# Patient Record
Sex: MALE | Race: WHITE
[De-identification: names, ages, dates, MRNs, and addresses within clinical notes are randomized per-mention and may not be internally consistent; named-entity substitution may affect disease eponyms.]

---

## 2021-12-06 ENCOUNTER — APPOINTMENT (OUTPATIENT)
Dept: INTERNAL MEDICINE | Facility: CLINIC | Age: 56
End: 2021-12-06
Payer: COMMERCIAL

## 2021-12-06 ENCOUNTER — NON-APPOINTMENT (OUTPATIENT)
Age: 56
End: 2021-12-06

## 2021-12-06 VITALS
HEIGHT: 64 IN | BODY MASS INDEX: 34.15 KG/M2 | HEART RATE: 112 BPM | SYSTOLIC BLOOD PRESSURE: 132 MMHG | OXYGEN SATURATION: 96 % | TEMPERATURE: 98.4 F | DIASTOLIC BLOOD PRESSURE: 90 MMHG | WEIGHT: 200 LBS

## 2021-12-06 VITALS — SYSTOLIC BLOOD PRESSURE: 116 MMHG | HEART RATE: 112 BPM | DIASTOLIC BLOOD PRESSURE: 72 MMHG

## 2021-12-06 DIAGNOSIS — H54.8 LEGAL BLINDNESS, AS DEFINED IN USA: ICD-10-CM

## 2021-12-06 DIAGNOSIS — I10 ESSENTIAL (PRIMARY) HYPERTENSION: ICD-10-CM

## 2021-12-06 DIAGNOSIS — Z12.11 ENCOUNTER FOR SCREENING FOR MALIGNANT NEOPLASM OF COLON: ICD-10-CM

## 2021-12-06 DIAGNOSIS — Z82.49 FAMILY HISTORY OF ISCHEMIC HEART DISEASE AND OTHER DISEASES OF THE CIRCULATORY SYSTEM: ICD-10-CM

## 2021-12-06 DIAGNOSIS — E78.5 HYPERLIPIDEMIA, UNSPECIFIED: ICD-10-CM

## 2021-12-06 DIAGNOSIS — Z00.00 ENCOUNTER FOR GENERAL ADULT MEDICAL EXAMINATION W/OUT ABNORMAL FINDINGS: ICD-10-CM

## 2021-12-06 DIAGNOSIS — Z78.9 OTHER SPECIFIED HEALTH STATUS: ICD-10-CM

## 2021-12-06 DIAGNOSIS — Z23 ENCOUNTER FOR IMMUNIZATION: ICD-10-CM

## 2021-12-06 DIAGNOSIS — H91.90 UNSPECIFIED HEARING LOSS, UNSPECIFIED EAR: ICD-10-CM

## 2021-12-06 DIAGNOSIS — E11.9 TYPE 2 DIABETES MELLITUS W/OUT COMPLICATIONS: ICD-10-CM

## 2021-12-06 DIAGNOSIS — Z82.5 FAMILY HISTORY OF ASTHMA AND OTHER CHRONIC LOWER RESPIRATORY DISEASES: ICD-10-CM

## 2021-12-06 DIAGNOSIS — F17.200 NICOTINE DEPENDENCE, UNSPECIFIED, UNCOMPLICATED: ICD-10-CM

## 2021-12-06 PROCEDURE — 0013A: CPT

## 2021-12-06 PROCEDURE — 93000 ELECTROCARDIOGRAM COMPLETE: CPT

## 2021-12-06 PROCEDURE — 36415 COLL VENOUS BLD VENIPUNCTURE: CPT

## 2021-12-06 PROCEDURE — 99386 PREV VISIT NEW AGE 40-64: CPT | Mod: 25

## 2021-12-06 RX ORDER — RAMIPRIL 5 MG/1
5 CAPSULE ORAL
Qty: 90 | Refills: 3 | Status: ACTIVE | OUTPATIENT
Start: 2021-11-28

## 2021-12-06 RX ORDER — VITS A,C,E/LUTEIN/MINERALS 300MCG-200
TABLET ORAL DAILY
Refills: 0 | Status: ACTIVE | COMMUNITY

## 2021-12-06 NOTE — HISTORY OF PRESENT ILLNESS
[FreeTextEntry1] : New patient physical [de-identified] : 56 year old M comes to establish medical care and for an annual exam. Previously followed by Dr. Ashwin Marquez at [facility] who stopped taking his insurance.\par \par He has hx of HTN and DM. He takes Metformin and Enalapril. \par He is legally blind. Followed with ophthalmologist Dr. Mojgan Mar at [facility]. \par He would like Moderna booster shot today.

## 2021-12-06 NOTE — PLAN
[FreeTextEntry1] : Check routine fasting labs.\par Discussed diet, exercise, and weight maintenance. Advised on weight loss.\par Moderna booster vaccine given today. Received flu vaccine.\par Active smoker. Consider CT lung cancer screening test.\par Prostate cancer screening with PSA.\par Due for colonoscopy, GI referral placed.\par HTN controlled on Ramipril.\par Check CMP, A1c, and lipid profile. Continue Metformin for DM. Not on statin. \par \par RTO in 6 months for BP check and fasting labs.

## 2021-12-06 NOTE — PHYSICAL EXAM
[No Acute Distress] : no acute distress [Well Nourished] : well nourished [Well Developed] : well developed [Well-Appearing] : well-appearing [Normal Sclera/Conjunctiva] : normal sclera/conjunctiva [PERRL] : pupils equal round and reactive to light [EOMI] : extraocular movements intact [Normal Outer Ear/Nose] : the outer ears and nose were normal in appearance [Normal Oropharynx] : the oropharynx was normal [Normal TMs] : both tympanic membranes were normal [No JVD] : no jugular venous distention [No Lymphadenopathy] : no lymphadenopathy [Supple] : supple [Thyroid Normal, No Nodules] : the thyroid was normal and there were no nodules present [No Respiratory Distress] : no respiratory distress  [No Accessory Muscle Use] : no accessory muscle use [Clear to Auscultation] : lungs were clear to auscultation bilaterally [Normal Rate] : normal rate  [Regular Rhythm] : with a regular rhythm [Normal S1, S2] : normal S1 and S2 [No Murmur] : no murmur heard [No Carotid Bruits] : no carotid bruits [No Varicosities] : no varicosities [Pedal Pulses Present] : the pedal pulses are present [No Edema] : there was no peripheral edema [Soft] : abdomen soft [Non Tender] : non-tender [Non-distended] : non-distended [No Masses] : no abdominal mass palpated [No HSM] : no HSM [Normal Bowel Sounds] : normal bowel sounds [Normal Posterior Cervical Nodes] : no posterior cervical lymphadenopathy [Normal Anterior Cervical Nodes] : no anterior cervical lymphadenopathy [No CVA Tenderness] : no CVA  tenderness [No Spinal Tenderness] : no spinal tenderness [No Joint Swelling] : no joint swelling [Grossly Normal Strength/Tone] : grossly normal strength/tone [No Rash] : no rash [Coordination Grossly Intact] : coordination grossly intact [No Focal Deficits] : no focal deficits [Normal Gait] : normal gait [Deep Tendon Reflexes (DTR)] : deep tendon reflexes were 2+ and symmetric [Normal Affect] : the affect was normal [Normal Mood] : the mood was normal [Normal Insight/Judgement] : insight and judgment were intact [de-identified] : friendly, overweight [FreeTextEntry1] : defer to GI  [de-identified] : limited ROM right shoulder

## 2021-12-06 NOTE — REVIEW OF SYSTEMS
[Negative] : Heme/Lymph [Vision Problems] : vision problems [Joint Pain] : joint pain [FreeTextEntry9] : right shoulder pain x 3 months

## 2022-01-17 LAB
25(OH)D3 SERPL-MCNC: 25.9 NG/ML
ALBUMIN SERPL ELPH-MCNC: 4.9 G/DL
ALP BLD-CCNC: 124 U/L
ALT SERPL-CCNC: 40 U/L
ANION GAP SERPL CALC-SCNC: 15 MMOL/L
APPEARANCE: CLEAR
AST SERPL-CCNC: 23 U/L
BACTERIA: NEGATIVE
BASOPHILS # BLD AUTO: 0.05 K/UL
BASOPHILS NFR BLD AUTO: 0.4 %
BILIRUB SERPL-MCNC: 0.5 MG/DL
BILIRUBIN URINE: NEGATIVE
BLOOD URINE: NEGATIVE
BUN SERPL-MCNC: 9 MG/DL
CALCIUM SERPL-MCNC: 9.5 MG/DL
CHLORIDE SERPL-SCNC: 99 MMOL/L
CHOLEST SERPL-MCNC: 169 MG/DL
CO2 SERPL-SCNC: 23 MMOL/L
COLOR: YELLOW
CREAT SERPL-MCNC: 0.74 MG/DL
EOSINOPHIL # BLD AUTO: 0.1 K/UL
EOSINOPHIL NFR BLD AUTO: 0.9 %
ESTIMATED AVERAGE GLUCOSE: 209 MG/DL
GLUCOSE QUALITATIVE U: NEGATIVE
GLUCOSE SERPL-MCNC: 136 MG/DL
HBA1C MFR BLD HPLC: 8.9 %
HCT VFR BLD CALC: 48.1 %
HDLC SERPL-MCNC: 61 MG/DL
HGB BLD-MCNC: 16.6 G/DL
HYALINE CASTS: 0 /LPF
IMM GRANULOCYTES NFR BLD AUTO: 0.6 %
KETONES URINE: NEGATIVE
LDLC SERPL CALC-MCNC: 91 MG/DL
LEUKOCYTE ESTERASE URINE: NEGATIVE
LYMPHOCYTES # BLD AUTO: 3.95 K/UL
LYMPHOCYTES NFR BLD AUTO: 35.3 %
MAN DIFF?: NORMAL
MCHC RBC-ENTMCNC: 33.7 PG
MCHC RBC-ENTMCNC: 34.5 GM/DL
MCV RBC AUTO: 97.8 FL
MICROSCOPIC-UA: NORMAL
MONOCYTES # BLD AUTO: 0.74 K/UL
MONOCYTES NFR BLD AUTO: 6.6 %
NEUTROPHILS # BLD AUTO: 6.28 K/UL
NEUTROPHILS NFR BLD AUTO: 56.2 %
NITRITE URINE: NEGATIVE
NONHDLC SERPL-MCNC: 108 MG/DL
PH URINE: 6
PLATELET # BLD AUTO: 285 K/UL
POTASSIUM SERPL-SCNC: 4.4 MMOL/L
PROT SERPL-MCNC: 8 G/DL
PROTEIN URINE: ABNORMAL
PSA SERPL-MCNC: 2.08 NG/ML
RBC # BLD: 4.92 M/UL
RBC # FLD: 12.7 %
RED BLOOD CELLS URINE: 1 /HPF
SODIUM SERPL-SCNC: 137 MMOL/L
SPECIFIC GRAVITY URINE: 1.02
SQUAMOUS EPITHELIAL CELLS: 0 /HPF
TRIGL SERPL-MCNC: 88 MG/DL
TSH SERPL-ACNC: 2.94 UIU/ML
UROBILINOGEN URINE: NORMAL
VIT B12 SERPL-MCNC: 844 PG/ML
WBC # FLD AUTO: 11.19 K/UL
WHITE BLOOD CELLS URINE: 1 /HPF

## 2022-01-17 RX ORDER — METFORMIN ER 500 MG 500 MG/1
500 TABLET ORAL
Qty: 360 | Refills: 3 | Status: ACTIVE | COMMUNITY
Start: 2021-11-28 | End: 1900-01-01

## 2022-01-18 ENCOUNTER — NON-APPOINTMENT (OUTPATIENT)
Age: 57
End: 2022-01-18

## 2022-06-07 ENCOUNTER — APPOINTMENT (OUTPATIENT)
Dept: INTERNAL MEDICINE | Facility: CLINIC | Age: 57
End: 2022-06-07

## 2022-10-27 ENCOUNTER — NON-APPOINTMENT (OUTPATIENT)
Age: 57
End: 2022-10-27

## 2023-07-27 NOTE — HEALTH RISK ASSESSMENT
[Excellent] : ~his/her~  mood as  excellent [No] : In the past 12 months have you used drugs other than those required for medical reasons? No [No falls in past year] : Patient reported no falls in the past year [0] : 2) Feeling down, depressed, or hopeless: Not at all (0) [PHQ-2 Negative - No further assessment needed] : PHQ-2 Negative - No further assessment needed [Smoke Detector] : smoke detector [Carbon Monoxide Detector] : carbon monoxide detector [Seat Belt] :  uses seat belt [Sunscreen] : uses sunscreen [Patient declined colonoscopy] : Patient declined colonoscopy [# of Members in Household ___] :  household currently consist of [unfilled] member(s) [Employed] : employed [Single] : single ADMIT [# Of Children ___] : has [unfilled] children [] : No [[id number]] : 0 [Sexually Active] : not sexually active [High Risk Behavior] : no high risk behavior [Reports changes in hearing] : Reports no changes in hearing [Reports changes in vision] : Reports no changes in vision [Reports changes in dental health] : Reports no changes in dental health [ColonoscopyComments] : never [FreeTextEntry2] : Staples

## 2023-08-30 ENCOUNTER — OFFICE (OUTPATIENT)
Dept: URBAN - METROPOLITAN AREA CLINIC 109 | Facility: CLINIC | Age: 58
Setting detail: OPHTHALMOLOGY
End: 2023-08-30
Payer: MEDICARE

## 2023-08-30 DIAGNOSIS — H16.223: ICD-10-CM

## 2023-08-30 DIAGNOSIS — E11.9: ICD-10-CM

## 2023-08-30 DIAGNOSIS — H40.031: ICD-10-CM

## 2023-08-30 DIAGNOSIS — H47.013: ICD-10-CM

## 2023-08-30 PROCEDURE — 92020 GONIOSCOPY: CPT | Performed by: OPHTHALMOLOGY

## 2023-08-30 PROCEDURE — 92014 COMPRE OPH EXAM EST PT 1/>: CPT | Performed by: OPHTHALMOLOGY

## 2023-08-30 ASSESSMENT — REFRACTION_AUTOREFRACTION
OD_CYLINDER: SPH
OS_CYLINDER: -0.70
OS_AXIS: 519
OD_SPHERE: +0.75
OS_SPHERE: +1.25

## 2023-08-30 ASSESSMENT — TONOMETRY: OD_IOP_MMHG: 20

## 2023-08-30 ASSESSMENT — VISUAL ACUITY
OS_BCVA: CF 1FT
OD_BCVA: CF 1FT

## 2023-08-30 ASSESSMENT — KERATOMETRY
OS_K1POWER_DIOPTERS: 42.25
OS_K2POWER_DIOPTERS: 42.75
OD_K1POWER_DIOPTERS: 42.25
OD_AXISANGLE_DEGREES: 21
OS_AXISANGLE_DEGREES: 6
OD_K2POWER_DIOPTERS: 42.50

## 2023-08-30 ASSESSMENT — SUPERFICIAL PUNCTATE KERATITIS (SPK)
OS_SPK: ABSENT
OD_SPK: ABSENT

## 2023-08-30 ASSESSMENT — SPHEQUIV_DERIVED: OS_SPHEQUIV: 0.9

## 2023-08-30 ASSESSMENT — PACHYMETRY
OD_CT_UM: 561
OS_CT_CORRECTION: -2
OS_CT_UM: 571
OD_CT_CORRECTION: -1

## 2023-08-30 ASSESSMENT — AXIALLENGTH_DERIVED: OS_AL: 23.6086

## 2023-09-28 ENCOUNTER — OFFICE (OUTPATIENT)
Dept: URBAN - METROPOLITAN AREA CLINIC 109 | Facility: CLINIC | Age: 58
Setting detail: OPHTHALMOLOGY
End: 2023-09-28
Payer: MEDICARE

## 2023-09-28 DIAGNOSIS — H40.031: ICD-10-CM

## 2023-09-28 PROCEDURE — 66761 REVISION OF IRIS: CPT | Performed by: OPHTHALMOLOGY

## 2023-09-28 ASSESSMENT — REFRACTION_AUTOREFRACTION
OD_CYLINDER: SPH
OS_AXIS: 519
OD_SPHERE: +0.75
OS_SPHERE: +1.25
OS_CYLINDER: -0.70

## 2023-09-28 ASSESSMENT — KERATOMETRY
OS_AXISANGLE_DEGREES: 6
OS_K2POWER_DIOPTERS: 42.75
OD_K1POWER_DIOPTERS: 42.25
OD_K2POWER_DIOPTERS: 42.50
OS_K1POWER_DIOPTERS: 42.25
OD_AXISANGLE_DEGREES: 21

## 2023-09-28 ASSESSMENT — AXIALLENGTH_DERIVED: OS_AL: 23.6086

## 2023-09-28 ASSESSMENT — PACHYMETRY
OS_CT_CORRECTION: -2
OS_CT_UM: 571
OD_CT_UM: 561
OD_CT_CORRECTION: -1

## 2023-09-28 ASSESSMENT — SPHEQUIV_DERIVED: OS_SPHEQUIV: 0.9

## 2023-09-28 ASSESSMENT — TONOMETRY: OD_IOP_MMHG: 20

## 2023-09-28 ASSESSMENT — VISUAL ACUITY: OD_BCVA: 400

## 2024-01-29 ENCOUNTER — OFFICE (OUTPATIENT)
Dept: URBAN - METROPOLITAN AREA CLINIC 109 | Facility: CLINIC | Age: 59
Setting detail: OPHTHALMOLOGY
End: 2024-01-29
Payer: MEDICARE

## 2024-01-29 DIAGNOSIS — H35.54: ICD-10-CM

## 2024-01-29 DIAGNOSIS — H47.013: ICD-10-CM

## 2024-01-29 DIAGNOSIS — H16.223: ICD-10-CM

## 2024-01-29 DIAGNOSIS — H40.031: ICD-10-CM

## 2024-01-29 DIAGNOSIS — E11.9: ICD-10-CM

## 2024-01-29 PROCEDURE — 92014 COMPRE OPH EXAM EST PT 1/>: CPT | Performed by: OPHTHALMOLOGY

## 2024-01-29 ASSESSMENT — SUPERFICIAL PUNCTATE KERATITIS (SPK)
OS_SPK: ABSENT
OD_SPK: ABSENT

## 2024-01-29 ASSESSMENT — REFRACTION_AUTOREFRACTION
OS_SPHERE: +1.00
OD_SPHERE: +0.75

## 2025-01-29 ENCOUNTER — OFFICE (OUTPATIENT)
Dept: URBAN - METROPOLITAN AREA CLINIC 109 | Facility: CLINIC | Age: 60
Setting detail: OPHTHALMOLOGY
End: 2025-01-29
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
## 2025-01-29 PROCEDURE — NO SHOW FE NO SHOW FEE: Performed by: OPHTHALMOLOGY

## 2025-02-06 ENCOUNTER — OFFICE (OUTPATIENT)
Dept: URBAN - METROPOLITAN AREA CLINIC 109 | Facility: CLINIC | Age: 60
Setting detail: OPHTHALMOLOGY
End: 2025-02-06
Payer: MEDICARE

## 2025-02-06 DIAGNOSIS — E11.9: ICD-10-CM

## 2025-02-06 DIAGNOSIS — H47.013: ICD-10-CM

## 2025-02-06 DIAGNOSIS — H25.13: ICD-10-CM

## 2025-02-06 DIAGNOSIS — H35.54: ICD-10-CM

## 2025-02-06 DIAGNOSIS — H16.223: ICD-10-CM

## 2025-02-06 DIAGNOSIS — H40.031: ICD-10-CM

## 2025-02-06 PROCEDURE — 92014 COMPRE OPH EXAM EST PT 1/>: CPT | Performed by: OPHTHALMOLOGY

## 2025-02-06 PROCEDURE — 92020 GONIOSCOPY: CPT | Performed by: OPHTHALMOLOGY

## 2025-02-06 ASSESSMENT — KERATOMETRY
OS_AXISANGLE_DEGREES: 6
OS_K1POWER_DIOPTERS: 42.25
OD_K2POWER_DIOPTERS: 42.50
OD_AXISANGLE_DEGREES: 21
OS_K2POWER_DIOPTERS: 42.75
OD_K1POWER_DIOPTERS: 42.25

## 2025-02-06 ASSESSMENT — VISUAL ACUITY
OD_BCVA: FC 1FT
OS_BCVA: FC 1FT

## 2025-02-06 ASSESSMENT — PACHYMETRY
OD_CT_CORRECTION: -1
OS_CT_CORRECTION: -2
OS_CT_UM: 571
OD_CT_UM: 561

## 2025-02-06 ASSESSMENT — SUPERFICIAL PUNCTATE KERATITIS (SPK)
OD_SPK: ABSENT
OS_SPK: ABSENT

## 2025-02-06 ASSESSMENT — REFRACTION_AUTOREFRACTION
OD_SPHERE: +0.75
OS_SPHERE: +1.00